# Patient Record
Sex: MALE | Race: WHITE | NOT HISPANIC OR LATINO | Employment: OTHER | URBAN - NONMETROPOLITAN AREA
[De-identification: names, ages, dates, MRNs, and addresses within clinical notes are randomized per-mention and may not be internally consistent; named-entity substitution may affect disease eponyms.]

---

## 2017-09-12 ENCOUNTER — OFFICE VISIT (OUTPATIENT)
Dept: UROLOGY | Facility: CLINIC | Age: 52
End: 2017-09-12

## 2017-09-12 VITALS — HEIGHT: 71 IN | WEIGHT: 245 LBS | BODY MASS INDEX: 34.3 KG/M2

## 2017-09-12 DIAGNOSIS — R33.9 INCOMPLETE EMPTYING OF BLADDER: ICD-10-CM

## 2017-09-12 DIAGNOSIS — R35.0 URINARY FREQUENCY: Primary | ICD-10-CM

## 2017-09-12 DIAGNOSIS — N39.41 URGENCY INCONTINENCE: ICD-10-CM

## 2017-09-12 LAB
BILIRUB BLD-MCNC: NEGATIVE MG/DL
CLARITY, POC: CLEAR
COLOR UR: YELLOW
GLUCOSE UR STRIP-MCNC: NEGATIVE MG/DL
KETONES UR QL: NEGATIVE
LEUKOCYTE EST, POC: NEGATIVE
NITRITE UR-MCNC: NEGATIVE MG/ML
PH UR: 6 [PH] (ref 5–8)
PROT UR STRIP-MCNC: NEGATIVE MG/DL
RBC # UR STRIP: NEGATIVE /UL
SP GR UR: 1.02 (ref 1–1.03)
UROBILINOGEN UR QL: NORMAL

## 2017-09-12 PROCEDURE — 99213 OFFICE O/P EST LOW 20 MIN: CPT | Performed by: UROLOGY

## 2017-09-12 PROCEDURE — 81003 URINALYSIS AUTO W/O SCOPE: CPT | Performed by: UROLOGY

## 2017-09-12 PROCEDURE — 51798 US URINE CAPACITY MEASURE: CPT | Performed by: UROLOGY

## 2017-09-12 RX ORDER — TAMSULOSIN HYDROCHLORIDE 0.4 MG/1
1 CAPSULE ORAL DAILY
Qty: 90 CAPSULE | Refills: 3 | Status: SHIPPED | OUTPATIENT
Start: 2017-09-12 | End: 2019-10-08 | Stop reason: SDUPTHER

## 2017-09-12 RX ORDER — SOLIFENACIN SUCCINATE 5 MG/1
10 TABLET, FILM COATED ORAL DAILY
Qty: 30 TABLET | Refills: 11 | Status: SHIPPED | OUTPATIENT
Start: 2017-09-12 | End: 2018-10-02 | Stop reason: SDUPTHER

## 2017-09-12 NOTE — PROGRESS NOTES
Chief Complaint:          Chief Complaint   Patient presents with   • Urinary Frequency     yearly f/u       HPI:   52 y.o. male.    HPI  Pt has some urine leakage.  Pt has a post void bladder ultrasound of 0 today.  He has some leakage of a drop or 2 without any significant urgency.  Pt is on vesicare and sildenalfil 20 mg.  Pt on flomax.  Pt has had a psa this year and it was normal by history      Past Medical History:        Past Medical History:   Diagnosis Date   • Anxiety    • Arthritis    • Depression    • Gastric ulcer    • Hypertension    • IBS (irritable bowel syndrome)    • Migraine    • Skin cancer    • Sleep apnea          Current Meds:     Current Outpatient Prescriptions   Medication Sig Dispense Refill   • amitriptyline (ELAVIL) 50 MG tablet Take  by mouth.     • aspirin 81 MG tablet Take  by mouth.     • busPIRone (BUSPAR) 10 MG tablet Take  by mouth.     • dexlansoprazole (DEXILANT) 60 MG capsule Take  by mouth.     • dicyclomine (BENTYL) 10 MG capsule Take  by mouth.     • DULoxetine (CYMBALTA) 60 MG capsule Take  by mouth.     • fluticasone (FLONASE) 50 MCG/ACT nasal spray into each nostril.     • hydrOXYzine (ATARAX) 25 MG tablet Take  by mouth.     • lisinopril (PRINIVIL,ZESTRIL) 30 MG tablet Take  by mouth.     • methocarbamol (ROBAXIN) 750 MG tablet Take  by mouth.     • minocycline (DYNACIN) 100 MG tablet Take  by mouth.     • simvastatin (ZOCOR) 10 MG tablet Take  by mouth.     • solifenacin (VESICARE) 5 MG tablet Take 2 tablets by mouth Daily. 30 tablet 11   • SUMAtriptan Succinate (SUMAVEL DOSEPRO) 6 MG/0.5ML solution jet-injector Inject  under the skin.     • tadalafil (CIALIS) 20 MG tablet Take  by mouth.     • tamsulosin (FLOMAX) 0.4 MG capsule 24 hr capsule Take 1 capsule by mouth Daily. 90 capsule 3   • verapamil PM (VERELAN PM) 180 MG 24 hr capsule Take  by mouth.       No current facility-administered medications for this visit.         Allergies:      Allergies   Allergen  Reactions   • Cephalexin         Past Surgical History:     Past Surgical History:   Procedure Laterality Date   • CHOLECYSTECTOMY     • SKIN BIOPSY           Social History:     Social History     Social History   • Marital status:      Spouse name: N/A   • Number of children: N/A   • Years of education: N/A     Occupational History   • Not on file.     Social History Main Topics   • Smoking status: Never Smoker   • Smokeless tobacco: Never Used   • Alcohol use No   • Drug use: No   • Sexual activity: Not on file     Other Topics Concern   • Not on file     Social History Narrative       Family History:     Family History   Problem Relation Age of Onset   • No Known Problems Father    • No Known Problems Mother        Review of Systems:     Review of Systems    Physical Exam:     Physical Exam   Constitutional: He is oriented to person, place, and time.   HENT:   Head: Normocephalic and atraumatic.   Right Ear: External ear normal.   Left Ear: External ear normal.   Nose: Nose normal.   Mouth/Throat: Oropharynx is clear and moist.   Eyes: Conjunctivae and EOM are normal. Pupils are equal, round, and reactive to light.   Neck: Normal range of motion. Neck supple. No thyromegaly present.   Cardiovascular: Normal rate, regular rhythm, normal heart sounds and intact distal pulses.    No murmur heard.  Pulmonary/Chest: Effort normal and breath sounds normal. No respiratory distress. He has no wheezes. He has no rales. He exhibits no tenderness.   Abdominal: Soft. Bowel sounds are normal. He exhibits no distension and no mass. There is no tenderness. No hernia.   Genitourinary: Rectum normal, prostate normal and penis normal.   Musculoskeletal: Normal range of motion. He exhibits no edema or tenderness.   Lymphadenopathy:     He has no cervical adenopathy.   Neurological: He is alert and oriented to person, place, and time. No cranial nerve deficit. He exhibits normal muscle tone. Coordination normal.   Skin: Skin  is warm. No rash noted.   Psychiatric: He has a normal mood and affect. His behavior is normal. Judgment and thought content normal.   Nursing note and vitals reviewed.      Procedure:     No notes on file      Assessment:     Encounter Diagnoses   Name Primary?   • Urinary frequency Yes   • Incomplete emptying of bladder    • Urgency incontinence      Orders Placed This Encounter   Procedures   • Bladder Scan   • POC Urinalysis Dipstick, Automated       Plan:   I have refilled vesicare and flomax.      Counseling was given to patient for the following topics diagnostic results, instructions for management, risk factor reductions, prognosis, patient and family education, impressions, risks and benefits of treatment options and importance of treatment compliance. and the interim medical history and current results were reviewed.  A treatment plan with follow-up was made. Total time of the encounter was 40 minutes and 30 minutes were spent discussing Urinary frequency [R35.0] face-to-face.        This document has been electronically signed by Aris Cortez MD September 12, 2017 2:47 PM

## 2018-10-02 ENCOUNTER — OFFICE VISIT (OUTPATIENT)
Dept: UROLOGY | Facility: CLINIC | Age: 53
End: 2018-10-02

## 2018-10-02 VITALS — WEIGHT: 258 LBS | BODY MASS INDEX: 36.12 KG/M2 | HEIGHT: 71 IN

## 2018-10-02 DIAGNOSIS — N52.9 ERECTILE DYSFUNCTION, UNSPECIFIED ERECTILE DYSFUNCTION TYPE: Primary | ICD-10-CM

## 2018-10-02 DIAGNOSIS — N39.41 URGENCY INCONTINENCE: ICD-10-CM

## 2018-10-02 PROCEDURE — 99214 OFFICE O/P EST MOD 30 MIN: CPT | Performed by: UROLOGY

## 2018-10-02 RX ORDER — SILDENAFIL CITRATE 20 MG/1
20 TABLET ORAL 3 TIMES DAILY
Qty: 90 TABLET | Refills: 5 | Status: SHIPPED | OUTPATIENT
Start: 2018-10-02 | End: 2021-11-22 | Stop reason: CLARIF

## 2018-10-02 RX ORDER — SOLIFENACIN SUCCINATE 5 MG/1
10 TABLET, FILM COATED ORAL DAILY
Qty: 30 TABLET | Refills: 11 | Status: SHIPPED | OUTPATIENT
Start: 2018-10-02 | End: 2019-10-08 | Stop reason: SDUPTHER

## 2018-10-02 NOTE — PROGRESS NOTES
Chief Complaint:          Erectile dysfunction, lethargy and decreased libido and urgency.    HPI:   53 y.o. male.  Pt is doing well with sildenafil.  He is tired and weak and decreased libido.  The pt's psa is 1.71 his blood was drawn this month.  His free testosterone  Was 71 normal above 47.his total was 371 normal above 193.    HPI      Past Medical History:        Past Medical History:   Diagnosis Date   • Anxiety    • Arthritis    • Depression    • Gastric ulcer    • Hypertension    • IBS (irritable bowel syndrome)    • Migraine    • Skin cancer    • Sleep apnea          Current Meds:     Current Outpatient Prescriptions   Medication Sig Dispense Refill   • amitriptyline (ELAVIL) 50 MG tablet Take  by mouth.     • aspirin 81 MG tablet Take  by mouth.     • busPIRone (BUSPAR) 10 MG tablet Take  by mouth.     • dexlansoprazole (DEXILANT) 60 MG capsule Take  by mouth.     • dicyclomine (BENTYL) 10 MG capsule Take  by mouth.     • DULoxetine (CYMBALTA) 60 MG capsule Take  by mouth.     • fluticasone (FLONASE) 50 MCG/ACT nasal spray into each nostril.     • hydrOXYzine (ATARAX) 25 MG tablet Take  by mouth.     • lisinopril (PRINIVIL,ZESTRIL) 30 MG tablet Take  by mouth.     • methocarbamol (ROBAXIN) 750 MG tablet Take  by mouth.     • minocycline (DYNACIN) 100 MG tablet Take  by mouth.     • simvastatin (ZOCOR) 10 MG tablet Take  by mouth.     • solifenacin (VESICARE) 5 MG tablet Take 2 tablets by mouth Daily. 30 tablet 11   • SUMAtriptan Succinate (SUMAVEL DOSEPRO) 6 MG/0.5ML solution jet-injector Inject  under the skin.     • tamsulosin (FLOMAX) 0.4 MG capsule 24 hr capsule Take 1 capsule by mouth Daily. 90 capsule 3   • verapamil PM (VERELAN PM) 180 MG 24 hr capsule Take  by mouth.     • sildenafil (REVATIO) 20 MG tablet Take 1 tablet by mouth 3 (Three) Times a Day. 90 tablet 5     No current facility-administered medications for this visit.         Allergies:      Allergies   Allergen Reactions   • Cephalexin          Past Surgical History:     Past Surgical History:   Procedure Laterality Date   • CHOLECYSTECTOMY     • SKIN BIOPSY           Social History:     Social History     Social History   • Marital status:      Spouse name: N/A   • Number of children: N/A   • Years of education: N/A     Occupational History   • Not on file.     Social History Main Topics   • Smoking status: Never Smoker   • Smokeless tobacco: Never Used   • Alcohol use No   • Drug use: No   • Sexual activity: Not on file     Other Topics Concern   • Not on file     Social History Narrative   • No narrative on file       Family History:     Family History   Problem Relation Age of Onset   • No Known Problems Father    • No Known Problems Mother        Review of Systems:     Review of Systems   Constitutional: Positive for fatigue. Negative for chills and fever.   HENT: Negative for congestion and sinus pressure.    Respiratory: Negative for shortness of breath.    Cardiovascular: Negative for chest pain.   Gastrointestinal: Negative for abdominal pain, constipation, diarrhea, nausea and vomiting.   Genitourinary: Negative for frequency and urgency.   Musculoskeletal: Negative for back pain and neck pain.   Neurological: Negative for dizziness and headaches.   Hematological: Does not bruise/bleed easily.   Psychiatric/Behavioral: The patient is not nervous/anxious.        I have reviewed the follow portions of the patient's history and confirmed they are accurate today:  allergies, current medications, past family history, past medical history, past social history, past surgical history, problem list and ROS  Physical Exam:     Physical Exam   Constitutional: He is oriented to person, place, and time.   HENT:   Head: Normocephalic and atraumatic.   Right Ear: External ear normal.   Left Ear: External ear normal.   Nose: Nose normal.   Mouth/Throat: Oropharynx is clear and moist.   Eyes: Pupils are equal, round, and reactive to light.  "Conjunctivae and EOM are normal.   Neck: Normal range of motion. Neck supple. No thyromegaly present.   Cardiovascular: Normal rate, regular rhythm, normal heart sounds and intact distal pulses.    No murmur heard.  Pulmonary/Chest: Effort normal and breath sounds normal. No respiratory distress. He has no wheezes. He has no rales. He exhibits no tenderness.   Abdominal: Soft. Bowel sounds are normal. He exhibits no distension and no mass. There is no tenderness. No hernia.   Genitourinary: Rectum normal, prostate normal and penis normal.   Musculoskeletal: Normal range of motion. He exhibits no edema or tenderness.   Lymphadenopathy:     He has no cervical adenopathy.   Neurological: He is alert and oriented to person, place, and time. No cranial nerve deficit. He exhibits normal muscle tone. Coordination normal.   Skin: Skin is warm. No rash noted.   Psychiatric: He has a normal mood and affect. His behavior is normal. Judgment and thought content normal.   Nursing note and vitals reviewed.      Ht 180.3 cm (71\")   Wt 117 kg (258 lb)   BMI 35.98 kg/m²    Procedure:         Assessment:     Encounter Diagnoses   Name Primary?   • Erectile dysfunction, unspecified erectile dysfunction type Yes   • Urgency incontinence      No orders of the defined types were placed in this encounter.      Plan:   Will refill his vesicare and sildenafil.  I discussed obesity and conversion of testosterone to estrogen by fat cells.  I discussed effective ways of weight loss and importance of exercise.  Just even walking    Patient's Body mass index is 35.98 kg/m². BMI is within normal parameters. No follow-up required.      Counseling was given to patient for the following topics instructions for management as follows: weight loss and exercise. The interim medical history and current results were reviewed.  A treatment plan with follow-up was made for Erectile dysfunction, unspecified erectile dysfunction type [N52.9]. I spent 33 " minutes face to face with Francisco Prieto and 90 percentage was spent in counseling.    This document has been electronically signed by Aris Cortez MD October 2, 2018 10:29 AM

## 2019-10-08 ENCOUNTER — OFFICE VISIT (OUTPATIENT)
Dept: UROLOGY | Facility: CLINIC | Age: 54
End: 2019-10-08

## 2019-10-08 VITALS
HEIGHT: 71 IN | SYSTOLIC BLOOD PRESSURE: 120 MMHG | WEIGHT: 245 LBS | DIASTOLIC BLOOD PRESSURE: 88 MMHG | BODY MASS INDEX: 34.3 KG/M2

## 2019-10-08 DIAGNOSIS — R35.0 URINARY FREQUENCY: ICD-10-CM

## 2019-10-08 DIAGNOSIS — N52.9 ERECTILE DYSFUNCTION, UNSPECIFIED ERECTILE DYSFUNCTION TYPE: Primary | ICD-10-CM

## 2019-10-08 DIAGNOSIS — R35.1 BENIGN PROSTATIC HYPERPLASIA WITH NOCTURIA: ICD-10-CM

## 2019-10-08 DIAGNOSIS — N39.41 URGENCY INCONTINENCE: ICD-10-CM

## 2019-10-08 DIAGNOSIS — N40.1 BENIGN PROSTATIC HYPERPLASIA WITH NOCTURIA: ICD-10-CM

## 2019-10-08 PROCEDURE — 99214 OFFICE O/P EST MOD 30 MIN: CPT | Performed by: UROLOGY

## 2019-10-08 RX ORDER — BACLOFEN 10 MG/1
TABLET ORAL 2 TIMES DAILY
Refills: 2 | COMMUNITY
Start: 2019-09-30

## 2019-10-08 RX ORDER — MELOXICAM 7.5 MG/1
TABLET ORAL 2 TIMES DAILY
Refills: 2 | COMMUNITY
Start: 2019-09-30

## 2019-10-08 RX ORDER — BUSPIRONE HYDROCHLORIDE 15 MG/1
TABLET ORAL 2 TIMES DAILY
Refills: 3 | COMMUNITY
Start: 2019-09-30

## 2019-10-08 RX ORDER — SILDENAFIL 100 MG/1
100 TABLET, FILM COATED ORAL DAILY PRN
Qty: 20 TABLET | Refills: 11 | Status: SHIPPED | OUTPATIENT
Start: 2019-10-08 | End: 2019-10-08

## 2019-10-08 RX ORDER — AZELAIC ACID 0.15 G/G
GEL TOPICAL
COMMUNITY
Start: 2019-09-30

## 2019-10-08 RX ORDER — VERAPAMIL HYDROCHLORIDE 240 MG/1
TABLET, FILM COATED, EXTENDED RELEASE ORAL DAILY
Refills: 2 | COMMUNITY
Start: 2019-09-30

## 2019-10-08 RX ORDER — HYDROCODONE BITARTRATE AND ACETAMINOPHEN 7.5; 325 MG/1; MG/1
TABLET ORAL
Refills: 0 | COMMUNITY
Start: 2019-10-03

## 2019-10-08 RX ORDER — SOLIFENACIN SUCCINATE 10 MG/1
10 TABLET, FILM COATED ORAL DAILY
Qty: 30 TABLET | Refills: 11 | Status: SHIPPED | OUTPATIENT
Start: 2019-10-08 | End: 2019-10-08 | Stop reason: SDUPTHER

## 2019-10-08 RX ORDER — SOLIFENACIN SUCCINATE 10 MG/1
TABLET, FILM COATED ORAL DAILY
Refills: 12 | COMMUNITY
Start: 2019-09-30 | End: 2019-10-08 | Stop reason: SDUPTHER

## 2019-10-08 RX ORDER — LAMOTRIGINE 150 MG/1
TABLET ORAL DAILY
Refills: 3 | COMMUNITY
Start: 2019-09-30

## 2019-10-08 RX ORDER — ISOTRETINOIN 40 MG/1
CAPSULE ORAL 2 TIMES DAILY
COMMUNITY
Start: 2019-08-12

## 2019-10-08 RX ORDER — HYDROXYZINE PAMOATE 25 MG/1
CAPSULE ORAL 2 TIMES DAILY
Refills: 3 | COMMUNITY
Start: 2019-09-30

## 2019-10-08 RX ORDER — SOLIFENACIN SUCCINATE 10 MG/1
10 TABLET, FILM COATED ORAL DAILY
Qty: 30 TABLET | Refills: 11 | Status: SHIPPED | OUTPATIENT
Start: 2019-10-08 | End: 2020-10-13 | Stop reason: SDUPTHER

## 2019-10-08 RX ORDER — MONTELUKAST SODIUM 4 MG/1
TABLET, CHEWABLE ORAL
Refills: 2 | COMMUNITY
Start: 2019-09-30

## 2019-10-08 RX ORDER — AMITRIPTYLINE HYDROCHLORIDE 100 MG/1
TABLET, FILM COATED ORAL DAILY
Refills: 3 | COMMUNITY
Start: 2019-09-30

## 2019-10-08 RX ORDER — RANITIDINE 300 MG/1
TABLET ORAL 2 TIMES DAILY
Refills: 2 | COMMUNITY
Start: 2019-09-30 | End: 2021-11-22 | Stop reason: SDUPTHER

## 2019-10-08 RX ORDER — TOPIRAMATE 25 MG/1
TABLET ORAL 2 TIMES DAILY
Refills: 3 | COMMUNITY
Start: 2019-09-30

## 2019-10-08 RX ORDER — LISINOPRIL 40 MG/1
TABLET ORAL DAILY
Refills: 2 | COMMUNITY
Start: 2019-09-30

## 2019-10-08 RX ORDER — SILDENAFIL 100 MG/1
100 TABLET, FILM COATED ORAL DAILY PRN
Qty: 20 TABLET | Refills: 11 | Status: SHIPPED | OUTPATIENT
Start: 2019-10-08 | End: 2020-10-13 | Stop reason: SDUPTHER

## 2019-10-08 RX ORDER — IPRATROPIUM BROMIDE 42 UG/1
SPRAY, METERED NASAL
Refills: 6 | COMMUNITY
Start: 2019-09-30 | End: 2019-10-08 | Stop reason: SDUPTHER

## 2019-10-08 RX ORDER — DICYCLOMINE HCL 20 MG
TABLET ORAL AS NEEDED
Refills: 2 | COMMUNITY
Start: 2019-09-30

## 2019-10-08 RX ORDER — AZELASTINE 1 MG/ML
SPRAY, METERED NASAL
Refills: 6 | COMMUNITY
Start: 2019-09-30

## 2019-10-08 RX ORDER — TAMSULOSIN HYDROCHLORIDE 0.4 MG/1
1 CAPSULE ORAL DAILY
Qty: 90 CAPSULE | Refills: 3 | Status: SHIPPED | OUTPATIENT
Start: 2019-10-08 | End: 2020-10-13 | Stop reason: SDUPTHER

## 2019-10-08 RX ORDER — ASPIRIN 81 MG
TABLET, DELAYED RELEASE (ENTERIC COATED) ORAL DAILY
Refills: 2 | COMMUNITY
Start: 2019-09-30

## 2019-10-08 NOTE — PROGRESS NOTES
Chief Complaint:          ED loss of libido.    HPI:   54 y.o. male.    HPI  Pt does well with sildenafil.  He asked for generic vesicare.  Pt has a testoseterone of 438.    Past Medical History:        Past Medical History:   Diagnosis Date   • Anxiety    • Arthritis    • Depression    • Gastric ulcer    • Hypertension    • IBS (irritable bowel syndrome)    • Migraine    • Skin cancer    • Sleep apnea          Current Meds:     Current Outpatient Medications   Medication Sig Dispense Refill   • amitriptyline (ELAVIL) 100 MG tablet Daily.  3   • AMNESTEEM 40 MG capsule 2 (Two) Times a Day.     • ASPIRIN LOW DOSE 81 MG EC tablet Daily.  2   • azelaic acid (AZELEX) 15 % gel      • azelastine (ASTELIN) 0.1 % nasal spray   6   • baclofen (LIORESAL) 10 MG tablet 2 (Two) Times a Day.  2   • busPIRone (BUSPAR) 15 MG tablet 2 (Two) Times a Day.  3   • colestipol (COLESTID) 1 g tablet   2   • dicyclomine (BENTYL) 20 MG tablet As Needed.  2   • DULoxetine (CYMBALTA) 60 MG capsule Take  by mouth.     • fluticasone (FLONASE) 50 MCG/ACT nasal spray into each nostril.     • HYDROcodone-acetaminophen (NORCO) 7.5-325 MG per tablet   0   • hydrOXYzine pamoate (VISTARIL) 25 MG capsule 2 (Two) Times a Day.  3   • lamoTRIgine (LaMICtal) 150 MG tablet Daily.  3   • lisinopril (PRINIVIL,ZESTRIL) 40 MG tablet Daily.  2   • meloxicam (MOBIC) 7.5 MG tablet 2 (Two) Times a Day.  2   • methocarbamol (ROBAXIN) 750 MG tablet Take  by mouth.     • metroNIDAZOLE (METROCREAM) 0.75 % cream   5   • minocycline (DYNACIN) 100 MG tablet Take  by mouth.     • raNITIdine (ZANTAC) 300 MG tablet 2 (Two) Times a Day.  2   • sildenafil (REVATIO) 20 MG tablet Take 1 tablet by mouth 3 (Three) Times a Day. 90 tablet 5   • simvastatin (ZOCOR) 10 MG tablet Take  by mouth.     • solifenacin (VESICARE) 10 MG tablet Daily.  12   • SUMAtriptan Succinate (SUMAVEL DOSEPRO) 6 MG/0.5ML solution jet-injector Inject  under the skin.     • tamsulosin (FLOMAX) 0.4 MG capsule  24 hr capsule Take 1 capsule by mouth Daily. 90 capsule 3   • topiramate (TOPAMAX) 25 MG tablet 2 (Two) Times a Day.  3   • verapamil SR (CALAN-SR) 240 MG CR tablet Daily.  2   • sildenafil (VIAGRA) 100 MG tablet Take 1 tablet by mouth Daily As Needed for erectile dysfunction. 20 tablet 11   • solifenacin (VESICARE) 10 MG tablet Take 1 tablet by mouth Daily. 30 tablet 11     No current facility-administered medications for this visit.         Allergies:      Allergies   Allergen Reactions   • Cephalexin         Past Surgical History:     Past Surgical History:   Procedure Laterality Date   • CHOLECYSTECTOMY     • SKIN BIOPSY           Social History:     Social History     Socioeconomic History   • Marital status:      Spouse name: Not on file   • Number of children: Not on file   • Years of education: Not on file   • Highest education level: Not on file   Tobacco Use   • Smoking status: Never Smoker   • Smokeless tobacco: Never Used   Substance and Sexual Activity   • Alcohol use: No   • Drug use: No       Family History:     Family History   Problem Relation Age of Onset   • No Known Problems Father    • No Known Problems Mother        Review of Systems:     Review of Systems   Constitutional: Positive for fatigue. Negative for chills and fever.   HENT: Negative for sinus pressure and sinus pain.    Respiratory: Negative for cough.    Cardiovascular: Negative for leg swelling.   Gastrointestinal: Negative for abdominal pain.   Genitourinary: Negative for dysuria, frequency and urgency.   Musculoskeletal: Negative for back pain.   Neurological: Negative for headaches.   Psychiatric/Behavioral: The patient is not nervous/anxious.          Physical Exam:     Physical Exam   Constitutional: He is oriented to person, place, and time.   HENT:   Head: Normocephalic and atraumatic.   Right Ear: External ear normal.   Left Ear: External ear normal.   Nose: Nose normal.   Mouth/Throat: Oropharynx is clear and moist.  "  Eyes: Conjunctivae and EOM are normal. Pupils are equal, round, and reactive to light.   Neck: Normal range of motion. Neck supple. No thyromegaly present.   Cardiovascular: Normal rate, regular rhythm, normal heart sounds and intact distal pulses.   No murmur heard.  Pulmonary/Chest: Effort normal and breath sounds normal. No respiratory distress. He has no wheezes. He has no rales. He exhibits no tenderness.   Abdominal: Soft. Bowel sounds are normal. He exhibits no distension and no mass. There is no tenderness. No hernia.   Genitourinary: Rectum normal, prostate normal and penis normal.   Musculoskeletal: Normal range of motion. He exhibits no edema or tenderness.   Lymphadenopathy:     He has no cervical adenopathy.   Neurological: He is alert and oriented to person, place, and time. No cranial nerve deficit. He exhibits normal muscle tone. Coordination normal.   Skin: Skin is warm. No rash noted.   Psychiatric: He has a normal mood and affect. His behavior is normal. Judgment and thought content normal.   Nursing note and vitals reviewed.      /88 (BP Location: Left arm, Patient Position: Sitting, Cuff Size: Adult)   Ht 180.3 cm (71\")   Wt 111 kg (245 lb)   BMI 34.17 kg/m²    Procedure:         Assessment:     Encounter Diagnoses   Name Primary?   • Erectile dysfunction, unspecified erectile dysfunction type Yes   • Urgency incontinence    • Benign prostatic hyperplasia with nocturia        Orders Placed This Encounter   Procedures   • PSA DIAGNOSTIC     Standing Status:   Future     Standing Expiration Date:   10/8/2022       Patient reports that he is not currently experiencing any symptoms of urinary incontinence.      Plan:   Will refill his generic viagra and vesicare.    Patient's Body mass index is 34.17 kg/m². BMI is above normal parameters. Recommendations include: educational material.      Smoking Cessation Counseling:  Never a smoker.  Patient does not currently use any tobacco products. "     Counseling was given to patient for the following topics instructions for management as follows: ED and urgency. The interim medical history and current results were reviewed.  A treatment plan with follow-up was made for Erectile dysfunction, unspecified erectile dysfunction type [N52.9]   This document has been electronically signed by Aris Cortez MD October 8, 2019 12:01 PM

## 2020-10-13 ENCOUNTER — OFFICE VISIT (OUTPATIENT)
Dept: UROLOGY | Facility: CLINIC | Age: 55
End: 2020-10-13

## 2020-10-13 VITALS — WEIGHT: 271.8 LBS | TEMPERATURE: 99 F | HEIGHT: 71 IN | BODY MASS INDEX: 38.05 KG/M2

## 2020-10-13 DIAGNOSIS — N52.1 ERECTILE DYSFUNCTION DUE TO DISEASES CLASSIFIED ELSEWHERE: ICD-10-CM

## 2020-10-13 DIAGNOSIS — R35.0 URINARY FREQUENCY: ICD-10-CM

## 2020-10-13 DIAGNOSIS — N48.1 BALANITIS: ICD-10-CM

## 2020-10-13 DIAGNOSIS — N39.41 URGENCY INCONTINENCE: Primary | ICD-10-CM

## 2020-10-13 DIAGNOSIS — N40.1 BENIGN PROSTATIC HYPERPLASIA WITH LOWER URINARY TRACT SYMPTOMS, SYMPTOM DETAILS UNSPECIFIED: ICD-10-CM

## 2020-10-13 DIAGNOSIS — N52.9 ERECTILE DYSFUNCTION, UNSPECIFIED ERECTILE DYSFUNCTION TYPE: ICD-10-CM

## 2020-10-13 LAB
BILIRUB BLD-MCNC: ABNORMAL MG/DL
CLARITY, POC: CLEAR
COLOR UR: YELLOW
GLUCOSE UR STRIP-MCNC: NEGATIVE MG/DL
KETONES UR QL: NEGATIVE
LEUKOCYTE EST, POC: NEGATIVE
NITRITE UR-MCNC: NEGATIVE MG/ML
PH UR: 5 [PH] (ref 5–8)
PROT UR STRIP-MCNC: NEGATIVE MG/DL
RBC # UR STRIP: NEGATIVE /UL
SP GR UR: 1.03 (ref 1–1.03)
UROBILINOGEN UR QL: NORMAL

## 2020-10-13 PROCEDURE — 81003 URINALYSIS AUTO W/O SCOPE: CPT | Performed by: UROLOGY

## 2020-10-13 PROCEDURE — 99214 OFFICE O/P EST MOD 30 MIN: CPT | Performed by: UROLOGY

## 2020-10-13 RX ORDER — TAMSULOSIN HYDROCHLORIDE 0.4 MG/1
1 CAPSULE ORAL DAILY
Qty: 90 CAPSULE | Refills: 3 | Status: SHIPPED | OUTPATIENT
Start: 2020-10-13 | End: 2021-11-22 | Stop reason: SDUPTHER

## 2020-10-13 RX ORDER — CLOTRIMAZOLE AND BETAMETHASONE DIPROPIONATE 10; .64 MG/G; MG/G
CREAM TOPICAL 2 TIMES DAILY
Qty: 45 G | Refills: 1 | Status: SHIPPED | OUTPATIENT
Start: 2020-10-13 | End: 2022-02-16 | Stop reason: SDUPTHER

## 2020-10-13 RX ORDER — SOLIFENACIN SUCCINATE 10 MG/1
10 TABLET, FILM COATED ORAL DAILY
Qty: 30 TABLET | Refills: 11 | Status: SHIPPED | OUTPATIENT
Start: 2020-10-13 | End: 2021-11-22 | Stop reason: SDUPTHER

## 2020-10-13 RX ORDER — SILDENAFIL 100 MG/1
100 TABLET, FILM COATED ORAL DAILY PRN
Qty: 20 TABLET | Refills: 11 | Status: SHIPPED | OUTPATIENT
Start: 2020-10-13 | End: 2020-10-13 | Stop reason: SDUPTHER

## 2020-10-13 RX ORDER — SILDENAFIL 100 MG/1
100 TABLET, FILM COATED ORAL DAILY PRN
Qty: 20 TABLET | Refills: 11 | Status: SHIPPED | OUTPATIENT
Start: 2020-10-13 | End: 2021-11-22 | Stop reason: SDUPTHER

## 2020-10-13 NOTE — PROGRESS NOTES
Chief Complaint:          BPH and ED.    HPI:   55 y.o. male.  hIs psa is 1.23 this year.His creat is 1.57  HPI  Pt is on vesicare and flomax.  He takes sildenafil as needed.    Past Medical History:        Past Medical History:   Diagnosis Date   • Anxiety    • Arthritis    • Depression    • Gastric ulcer    • Hypertension    • IBS (irritable bowel syndrome)    • Migraine    • Skin cancer    • Sleep apnea          Current Meds:     Current Outpatient Medications   Medication Sig Dispense Refill   • amitriptyline (ELAVIL) 100 MG tablet Daily.  3   • AMNESTEEM 40 MG capsule 2 (Two) Times a Day.     • ASPIRIN LOW DOSE 81 MG EC tablet Daily.  2   • azelaic acid (AZELEX) 15 % gel      • azelastine (ASTELIN) 0.1 % nasal spray   6   • baclofen (LIORESAL) 10 MG tablet 2 (Two) Times a Day.  2   • busPIRone (BUSPAR) 15 MG tablet 2 (Two) Times a Day.  3   • colestipol (COLESTID) 1 g tablet   2   • dicyclomine (BENTYL) 20 MG tablet As Needed.  2   • DULoxetine (CYMBALTA) 60 MG capsule Take  by mouth.     • fluticasone (FLONASE) 50 MCG/ACT nasal spray into each nostril.     • HYDROcodone-acetaminophen (NORCO) 7.5-325 MG per tablet   0   • hydrOXYzine pamoate (VISTARIL) 25 MG capsule 2 (Two) Times a Day.  3   • lamoTRIgine (LaMICtal) 150 MG tablet Daily.  3   • lisinopril (PRINIVIL,ZESTRIL) 40 MG tablet Daily.  2   • meloxicam (MOBIC) 7.5 MG tablet 2 (Two) Times a Day.  2   • methocarbamol (ROBAXIN) 750 MG tablet Take  by mouth.     • metroNIDAZOLE (METROCREAM) 0.75 % cream   5   • minocycline (DYNACIN) 100 MG tablet Take  by mouth.     • raNITIdine (ZANTAC) 300 MG tablet 2 (Two) Times a Day.  2   • sildenafil (REVATIO) 20 MG tablet Take 1 tablet by mouth 3 (Three) Times a Day. 90 tablet 5   • sildenafil (Viagra) 100 MG tablet Take 1 tablet by mouth Daily As Needed for Erectile Dysfunction. 20 tablet 11   • simvastatin (ZOCOR) 10 MG tablet Take  by mouth.     • solifenacin (VESICARE) 10 MG tablet Take 1 tablet by mouth Daily. 30  tablet 11   • SUMAtriptan Succinate (SUMAVEL DOSEPRO) 6 MG/0.5ML solution jet-injector Inject  under the skin.     • tamsulosin (FLOMAX) 0.4 MG capsule 24 hr capsule Take 1 capsule by mouth Daily. 90 capsule 3   • topiramate (TOPAMAX) 25 MG tablet 2 (Two) Times a Day.  3   • verapamil SR (CALAN-SR) 240 MG CR tablet Daily.  2     No current facility-administered medications for this visit.         Allergies:      Allergies   Allergen Reactions   • Cephalexin         Past Surgical History:     Past Surgical History:   Procedure Laterality Date   • CHOLECYSTECTOMY     • SKIN BIOPSY           Social History:     Social History     Socioeconomic History   • Marital status:      Spouse name: Not on file   • Number of children: Not on file   • Years of education: Not on file   • Highest education level: Not on file   Tobacco Use   • Smoking status: Never Smoker   • Smokeless tobacco: Never Used   Substance and Sexual Activity   • Alcohol use: No   • Drug use: No   • Sexual activity: Defer       Family History:     Family History   Problem Relation Age of Onset   • No Known Problems Father    • No Known Problems Mother        Review of Systems:     Review of Systems   Constitutional: Positive for fatigue. Negative for chills and fever.   HENT: Positive for sinus pressure. Negative for congestion.    Eyes: Negative for pain and redness.   Respiratory: Positive for shortness of breath. Negative for chest tightness.    Cardiovascular: Negative for chest pain.   Gastrointestinal: Positive for abdominal pain, constipation and diarrhea. Negative for nausea and vomiting.   Endocrine: Negative for heat intolerance.   Genitourinary: Positive for frequency. Negative for dysuria, flank pain, hematuria and urgency.   Musculoskeletal: Negative for back pain and neck pain.   Skin: Negative for rash.   Allergic/Immunologic: Negative for food allergies.   Neurological: Positive for headaches. Negative for dizziness.   Hematological:  "Bruises/bleeds easily.   Psychiatric/Behavioral: The patient is not nervous/anxious.        IPSS Questionnaire (AUA-7):  Over the past month…    1)  Incomplete Emptying  How often have you had a sensation of not emptying your bladder?  2 - Less than half the time   2)  Frequency  How often have you had to urinate less than every two hours? 1 - Less than 1 time in 5   3)  Intermittency  How often have you found you stopped and started again several times when you urinated?  0 - Not at all   4) Urgency  How often have you found it difficult to postpone urination?  3 - About half the time   5) Weak Stream  How often have you had a weak urinary stream?  3 - About half the time   6) Straining  How often have you had to push or strain to begin urination?  2 - Less than half the time   7) Nocturia  How many times did you typically get up at night to urinate?  1 - 1 time   Total Score:  12       Quality of life due to urinary symptoms:  If you were to spend the rest of your life with your urinary condition the way it is now, how would you feel about that? 2-Mostly Satisfied   Urine Leakage (Incontinence) 1-Mild (A few drops a day, no pad use)       I have reviewed the follow portions of the patient's history and confirmed they are accurate today:  allergies, current medications, past family history, past medical history, past social history, past surgical history, problem list and ROS  Physical Exam:     Physical Exam    Temp 99 °F (37.2 °C)   Ht 180.3 cm (71\")   Wt 123 kg (271 lb 12.8 oz)   BMI 37.91 kg/m²    Procedure:         Assessment:     Encounter Diagnoses   Name Primary?   • Urgency incontinence Yes   • Erectile dysfunction due to diseases classified elsewhere    • Benign prostatic hyperplasia with lower urinary tract symptoms, symptom details unspecified    • Urinary frequency    • Erectile dysfunction, unspecified erectile dysfunction type        Orders Placed This Encounter   Procedures   • POC Urinalysis " Dipstick, Automated       Patient reports that he is not currently experiencing any symptoms of urinary incontinence.      Plan:           Will rx lotrisone for balanitis and will see pt next year.    Patient's Body mass index is 37.91 kg/m². BMI is above normal parameters. Recommendations include: referral to primary care.      Smoking Cessation Counseling:  Former smoker.  Patient does not currently use any tobacco products.   Counseling was given to patient for the following topics instructions for management as follows: ED and urgency and BPH. The interim medical history and current results were reviewed.  A treatment plan with follow-up was made for Urgency incontinence [N39.41]. I spent 31 minutes face to face with Francisco Prieto and 80 percentage was spent in counseling.       This document has been electronically signed by Aris Cortez MD October 13, 2020 12:01 EDT

## 2020-12-31 ENCOUNTER — HOSPITAL ENCOUNTER (OUTPATIENT)
Dept: HOSPITAL 79 - LAB | Age: 55
End: 2020-12-31
Payer: MEDICARE

## 2020-12-31 DIAGNOSIS — R53.83: ICD-10-CM

## 2020-12-31 DIAGNOSIS — Z79.899: ICD-10-CM

## 2020-12-31 DIAGNOSIS — E78.5: ICD-10-CM

## 2020-12-31 DIAGNOSIS — L70.0: ICD-10-CM

## 2020-12-31 DIAGNOSIS — L73.2: ICD-10-CM

## 2020-12-31 DIAGNOSIS — L71.8: Primary | ICD-10-CM

## 2020-12-31 DIAGNOSIS — E53.8: ICD-10-CM

## 2020-12-31 LAB
BUN/CREATININE RATIO: 10 (ref 0–10)
HGB BLD-MCNC: 14.2 GM/DL (ref 14–17.5)
RED BLOOD COUNT: 5.34 M/UL (ref 4.2–5.5)
WHITE BLOOD COUNT: 5.8 K/UL (ref 4.5–11)

## 2021-05-18 ENCOUNTER — HOSPITAL ENCOUNTER (OUTPATIENT)
Dept: HOSPITAL 79 - EMI | Age: 56
End: 2021-05-18
Attending: NURSE PRACTITIONER
Payer: MEDICARE

## 2021-05-18 DIAGNOSIS — G44.89: ICD-10-CM

## 2021-05-18 DIAGNOSIS — R90.82: ICD-10-CM

## 2021-05-18 DIAGNOSIS — G43.009: Primary | ICD-10-CM

## 2021-11-08 ENCOUNTER — HOSPITAL ENCOUNTER (OUTPATIENT)
Dept: HOSPITAL 79 - HEART 5 | Age: 56
End: 2021-11-08
Attending: INTERNAL MEDICINE
Payer: MEDICARE

## 2021-11-08 DIAGNOSIS — R01.1: Primary | ICD-10-CM

## 2021-11-22 ENCOUNTER — OFFICE VISIT (OUTPATIENT)
Dept: UROLOGY | Facility: CLINIC | Age: 56
End: 2021-11-22

## 2021-11-22 VITALS — HEIGHT: 71 IN | WEIGHT: 285 LBS | BODY MASS INDEX: 39.9 KG/M2

## 2021-11-22 DIAGNOSIS — N39.41 URGENCY INCONTINENCE: ICD-10-CM

## 2021-11-22 DIAGNOSIS — N42.9 DISORDER OF PROSTATE: ICD-10-CM

## 2021-11-22 DIAGNOSIS — N52.9 ERECTILE DYSFUNCTION, UNSPECIFIED ERECTILE DYSFUNCTION TYPE: ICD-10-CM

## 2021-11-22 DIAGNOSIS — R35.0 URINARY FREQUENCY: ICD-10-CM

## 2021-11-22 DIAGNOSIS — N40.1 BENIGN PROSTATIC HYPERPLASIA WITH LOWER URINARY TRACT SYMPTOMS, SYMPTOM DETAILS UNSPECIFIED: Primary | ICD-10-CM

## 2021-11-22 DIAGNOSIS — N52.9 IMPOTENCE OF ORGANIC ORIGIN: ICD-10-CM

## 2021-11-22 PROCEDURE — 84403 ASSAY OF TOTAL TESTOSTERONE: CPT | Performed by: UROLOGY

## 2021-11-22 PROCEDURE — 84153 ASSAY OF PSA TOTAL: CPT | Performed by: UROLOGY

## 2021-11-22 PROCEDURE — 85027 COMPLETE CBC AUTOMATED: CPT | Performed by: UROLOGY

## 2021-11-22 PROCEDURE — 99214 OFFICE O/P EST MOD 30 MIN: CPT | Performed by: UROLOGY

## 2021-11-22 PROCEDURE — 82670 ASSAY OF TOTAL ESTRADIOL: CPT | Performed by: UROLOGY

## 2021-11-22 RX ORDER — IBUPROFEN 800 MG/1
TABLET ORAL
COMMUNITY
Start: 2021-10-13

## 2021-11-22 RX ORDER — GALCANEZUMAB 120 MG/ML
INJECTION, SOLUTION SUBCUTANEOUS
COMMUNITY
Start: 2021-10-26

## 2021-11-22 RX ORDER — FAMOTIDINE 40 MG/1
TABLET, FILM COATED ORAL
COMMUNITY
Start: 2021-11-08

## 2021-11-22 RX ORDER — DEXLANSOPRAZOLE 60 MG/1
CAPSULE, DELAYED RELEASE ORAL
COMMUNITY
Start: 2021-11-08

## 2021-11-22 RX ORDER — SIMVASTATIN 20 MG
TABLET ORAL
COMMUNITY
Start: 2021-11-08

## 2021-11-22 RX ORDER — COLCHICINE 0.6 MG/1
TABLET ORAL
COMMUNITY
Start: 2021-10-13

## 2021-11-22 RX ORDER — SILDENAFIL 100 MG/1
100 TABLET, FILM COATED ORAL DAILY PRN
Qty: 20 TABLET | Refills: 11 | Status: SHIPPED | OUTPATIENT
Start: 2021-11-22

## 2021-11-22 RX ORDER — SOLIFENACIN SUCCINATE 10 MG/1
10 TABLET, FILM COATED ORAL DAILY
Qty: 30 TABLET | Refills: 11 | Status: SHIPPED | OUTPATIENT
Start: 2021-11-22

## 2021-11-22 RX ORDER — CLOTRIMAZOLE AND BETAMETHASONE DIPROPIONATE 10; .64 MG/G; MG/G
1 CREAM TOPICAL 2 TIMES DAILY
Qty: 45 G | Refills: 2 | Status: SHIPPED | OUTPATIENT
Start: 2021-11-22 | End: 2022-02-16

## 2021-11-22 RX ORDER — TAMSULOSIN HYDROCHLORIDE 0.4 MG/1
1 CAPSULE ORAL DAILY
Qty: 90 CAPSULE | Refills: 3 | Status: SHIPPED | OUTPATIENT
Start: 2021-11-22

## 2021-11-22 RX ORDER — BETAMETHASONE DIPROPIONATE 0.5 MG/G
LOTION TOPICAL
COMMUNITY
Start: 2021-11-08

## 2021-11-22 RX ORDER — RIZATRIPTAN BENZOATE 10 MG/1
TABLET, ORALLY DISINTEGRATING ORAL
COMMUNITY
Start: 2021-10-26

## 2021-11-22 NOTE — PROGRESS NOTES
"Chief Complaint:          Chief Complaint   Patient presents with   • Urinary Incontinence   • Erectile Dysfunction       HPI:   56 y.o. male returns today.  He has been seen previously by Dr. Aris Cortez has a number of issues.  He is incontinent he takes Vesicare in a as needed basis he has hot flashes and a significant positive LUIS-androgen deficiency in the age male questionnaire:   The patient was queried regarding the androgen deficiency in the age male questionnaire.  This is a validated questionnaire that was performed on a set of 314 [facility] male physicians. When it was positive, it correlated directly with a 94% chance of low testosterone.  Patient indicates there is a decrease in libido or sex drive, a lack of energy, decreased  strength and endurance, a decreased \"enjoyment of life\", sad and grumpy feelings with significant difficulty maintaining erections. There has also been a recent deterioration regarding work performance.  Apparently had a testosterone PSA drawn at his PCP which was 292.  He wants refills of his Vesicare.  I reviewed what labs were provided.  Unfortunately was not enough to do what I need he had a testosterone of 292 and a free testosterone of 54.  He had a normal hemoglobin.  And no other abdomen labs checked.  I would initiate a work-up prostatitis-we discussed the differential diagnosis of prostatitis including the National Institutes of Health classification system I through IV.  I discussed that type I prostatitis is acute bacterial prostatitis and associated with high fevers, typically hematuria, and severe pain with voiding.  We discussed the fact that it necessitates 6 weeks of chronic antibiotics to be sure it doesn't turn into a chronic bacterial prostatitis that can have lifelong ramifications.  We discussed National Institutes of Health type II chronic bacterial prostatitis.  We discussed the fact that this a chronic bacterial infection of the prostate that often " requires suppressive antibiotics.  We discussed type III being related more to nonspecific urethritis, as well as tight for being related to finding inflammation and a biopsy in the absence of symptomatology.  I discussed the diagnostic strategies including the VB 1 through 4 urine culture and discussed empiric therapy.  I emphasized that with the use of chronic antibiotics, I strongly recommended the concomitant use of probiotics.  Additionally, we discussed the various antibiotics used and the risks and benefits associated with each, particularly the use of long-term ciprofloxacin and the effect on joints and collagen in human beings.  Given refills of his medication and he will follow up with him based on this      Past Medical History:        Past Medical History:   Diagnosis Date   • Anxiety    • Arthritis    • Depression    • Gastric ulcer    • Hypertension    • IBS (irritable bowel syndrome)    • Migraine    • Skin cancer    • Sleep apnea          Current Meds:     Current Outpatient Medications   Medication Sig Dispense Refill   • amitriptyline (ELAVIL) 100 MG tablet Daily.  3   • AMNESTEEM 40 MG capsule 2 (Two) Times a Day.     • ASPIRIN LOW DOSE 81 MG EC tablet Daily.  2   • azelaic acid (AZELEX) 15 % gel      • azelastine (ASTELIN) 0.1 % nasal spray   6   • baclofen (LIORESAL) 10 MG tablet 2 (Two) Times a Day.  2   • betamethasone dipropionate (DIPROLENE) 0.05 % lotion      • busPIRone (BUSPAR) 15 MG tablet 2 (Two) Times a Day.  3   • clotrimazole-betamethasone (LOTRISONE) 1-0.05 % cream Apply  topically to the appropriate area as directed 2 (Two) Times a Day. 45 g 1   • colchicine 0.6 MG tablet      • colestipol (COLESTID) 1 g tablet   2   • Dexilant 60 MG capsule      • dicyclomine (BENTYL) 20 MG tablet As Needed.  2   • DULoxetine (CYMBALTA) 60 MG capsule Take  by mouth.     • Emgality 120 MG/ML auto-injector pen      • famotidine (PEPCID) 40 MG tablet      • fluticasone (FLONASE) 50 MCG/ACT nasal  spray into each nostril.     • HYDROcodone-acetaminophen (NORCO) 7.5-325 MG per tablet   0   • hydrOXYzine pamoate (VISTARIL) 25 MG capsule 2 (Two) Times a Day.  3   • ibuprofen (ADVIL,MOTRIN) 800 MG tablet      • lamoTRIgine (LaMICtal) 150 MG tablet Daily.  3   • lisinopril (PRINIVIL,ZESTRIL) 40 MG tablet Daily.  2   • meloxicam (MOBIC) 7.5 MG tablet 2 (Two) Times a Day.  2   • methocarbamol (ROBAXIN) 750 MG tablet Take  by mouth.     • metoprolol tartrate (LOPRESSOR) 25 MG tablet      • metroNIDAZOLE (METROCREAM) 0.75 % cream   5   • minocycline (DYNACIN) 100 MG tablet Take  by mouth.     • rizatriptan MLT (MAXALT-MLT) 10 MG disintegrating tablet      • sildenafil (Viagra) 100 MG tablet Take 1 tablet by mouth Daily As Needed for Erectile Dysfunction. 20 tablet 11   • simvastatin (ZOCOR) 20 MG tablet      • solifenacin (VESICARE) 10 MG tablet Take 1 tablet by mouth Daily. 30 tablet 11   • SUMAtriptan Succinate (SUMAVEL DOSEPRO) 6 MG/0.5ML solution jet-injector Inject  under the skin.     • tamsulosin (FLOMAX) 0.4 MG capsule 24 hr capsule Take 1 capsule by mouth Daily. 90 capsule 3   • topiramate (TOPAMAX) 25 MG tablet 2 (Two) Times a Day.  3   • verapamil SR (CALAN-SR) 240 MG CR tablet Daily.  2     No current facility-administered medications for this visit.        Allergies:      Allergies   Allergen Reactions   • Cephalexin Rash        Past Surgical History:     Past Surgical History:   Procedure Laterality Date   • CHOLECYSTECTOMY     • SKIN BIOPSY           Social History:     Social History     Socioeconomic History   • Marital status:    Tobacco Use   • Smoking status: Never Smoker   • Smokeless tobacco: Never Used   Substance and Sexual Activity   • Alcohol use: No   • Drug use: No   • Sexual activity: Defer       Family History:     Family History   Problem Relation Age of Onset   • No Known Problems Father    • No Known Problems Mother        Review of Systems:     Review of Systems    Constitutional: Negative.    HENT: Negative.    Eyes: Negative.    Respiratory: Negative.    Cardiovascular: Negative.    Gastrointestinal: Negative.    Endocrine: Negative.    Musculoskeletal: Negative.    Allergic/Immunologic: Negative.    Neurological: Negative.    Hematological: Negative.    Psychiatric/Behavioral: Negative.        Physical Exam:     Physical Exam  Vitals and nursing note reviewed.   Constitutional:       Appearance: He is well-developed.   HENT:      Head: Normocephalic and atraumatic.   Eyes:      Conjunctiva/sclera: Conjunctivae normal.      Pupils: Pupils are equal, round, and reactive to light.   Cardiovascular:      Rate and Rhythm: Normal rate and regular rhythm.      Heart sounds: Normal heart sounds.   Pulmonary:      Effort: Pulmonary effort is normal.      Breath sounds: Normal breath sounds.   Abdominal:      General: Bowel sounds are normal.      Palpations: Abdomen is soft.   Musculoskeletal:         General: Normal range of motion.      Cervical back: Normal range of motion.   Skin:     General: Skin is warm and dry.   Neurological:      Mental Status: He is alert and oriented to person, place, and time.      Deep Tendon Reflexes: Reflexes are normal and symmetric.   Psychiatric:         Behavior: Behavior normal.         Thought Content: Thought content normal.         Judgment: Judgment normal.         I have reviewed the following portions of the patient's history: Allergies, current medications, past family history, past medical history, past social history, past surgical history, problem list, and ROS and confirm it is accurate.      Procedure:       Assessment/Plan:   Low Testosterone: This pleasant male patient presents today with signs and symptoms that are consistent with low testosterone. He has positive Napoleon questionnaire by history, this includes both the sexual and nonsexual side effects.  Sexual side effects include inability to achieve and maintain an erection,  inability to maintain his erection, and decreased interest in sexual activity.  Nonsexual symptomatology includes fatigue, difficulty completing a job, and tiredness.  We had a discussion of the various forms of testosterone available including parenteral, topical, and the form of a patch.  We discussed the efficacy of the gels and the injections, as well as the cost and benefits analysis.  We discussed the studies and talked about heart disease and its effect on prostate cancer, both of which are negligible.  He gave verbal consent to proceed with treatment.  He understands the risks and benefits of length.  He also completed his attempts at fertility. He understands the partial effect on spermatogenesis.  He has signs and symptoms including hot flashes consistent with low testosterone I will repeat the number and probably plan to start him on medication.  Detrusor instability-patient has been diagnosed with detrusor instability which is an irritative bladder symptomatology most likely related to factors such as intake of bladder irritants, postinfectious irritation, prolapse, with a very large differential diagnosis.  The mainstay of treatment has been tight cholinergics which basically cause the bladder to have decreased contractility.  We have discussed the side effects of these treatments including dry mouth, double vision, and increasing constipation.  He is on Vesicare  BPH: Discussed the pathophysiology of BPH and obstruction.  We discussed the static and dynamic effects of BPH as well as using 5 alpha reductase inhibitors versus alpha blockade.  We discussed the indications for transurethral surgery as well PSA testing-I am recommending a PSA blood test that stands for prostate specific antigen.  I discussed the pathophysiology of PSA testing indicating its use in the diagnosis and management of prostate cancer.  I discussed the normal range being 0 to 4, but more appropriately being much closer to 0 to 2  in a normal male.  I discussed the fact that after a certain age we don't recommend PSA testing especially in view of numerous comorbidities, that this will not be a useful test.  I discussed many of the things that can artificially raise PSA including a recent infection, urinary tract infection, and recent sexual intercourse, or even the type of movement such as manipulation of the prostate from riding a bicycle.  After all this is taken into account when the test is reviewed, the most important use of PSA is the velocity measurement.  In other words, the change of PSA with time is a very important factor in the use and that we look for greater than 20% rise over a year to help us make the prediction of prostate cancer.  I also discussed that the use with prostate cancer indicating that after a radical prostatectomy, the PSA should be 0 and any rise indicates an early biochemical recurrence.  And/ or other therapeutic options available including all of the newer techniques.  He is on Flomax                  This document has been electronically signed by KATHY BUTCHER MD November 22, 2021 13:54 EST

## 2021-11-23 LAB
DEPRECATED RDW RBC AUTO: 44.3 FL (ref 37–54)
ERYTHROCYTE [DISTWIDTH] IN BLOOD BY AUTOMATED COUNT: 14.7 % (ref 12.3–15.4)
ESTRADIOL SERPL HS-MCNC: 31.7 PG/ML
HCT VFR BLD AUTO: 44 % (ref 37.5–51)
HGB BLD-MCNC: 14.2 G/DL (ref 13–17.7)
MCH RBC QN AUTO: 26.9 PG (ref 26.6–33)
MCHC RBC AUTO-ENTMCNC: 32.3 G/DL (ref 31.5–35.7)
MCV RBC AUTO: 83.3 FL (ref 79–97)
PLATELET # BLD AUTO: 322 10*3/MM3 (ref 140–450)
PMV BLD AUTO: 10.8 FL (ref 6–12)
PSA SERPL-MCNC: 0.96 NG/ML (ref 0–4)
RBC # BLD AUTO: 5.28 10*6/MM3 (ref 4.14–5.8)
TESTOST SERPL-MCNC: 291 NG/DL (ref 193–740)
WBC NRBC COR # BLD: 7.24 10*3/MM3 (ref 3.4–10.8)

## 2021-11-29 PROBLEM — N40.1 BENIGN PROSTATIC HYPERPLASIA WITH LOWER URINARY TRACT SYMPTOMS: Status: ACTIVE | Noted: 2021-11-29

## 2021-12-01 ENCOUNTER — HOSPITAL ENCOUNTER (OUTPATIENT)
Dept: HOSPITAL 79 - CATH | Age: 56
Discharge: HOME | End: 2021-12-01
Attending: INTERNAL MEDICINE
Payer: MEDICARE

## 2021-12-01 DIAGNOSIS — R94.31: ICD-10-CM

## 2021-12-01 DIAGNOSIS — G47.30: ICD-10-CM

## 2021-12-01 DIAGNOSIS — Z20.822: ICD-10-CM

## 2021-12-01 DIAGNOSIS — Z79.899: ICD-10-CM

## 2021-12-01 DIAGNOSIS — N42.9: ICD-10-CM

## 2021-12-01 DIAGNOSIS — G43.009: ICD-10-CM

## 2021-12-01 DIAGNOSIS — Z88.1: ICD-10-CM

## 2021-12-01 DIAGNOSIS — J30.9: ICD-10-CM

## 2021-12-01 DIAGNOSIS — G44.89: ICD-10-CM

## 2021-12-01 DIAGNOSIS — M10.9: ICD-10-CM

## 2021-12-01 DIAGNOSIS — F31.9: ICD-10-CM

## 2021-12-01 DIAGNOSIS — K25.9: ICD-10-CM

## 2021-12-01 DIAGNOSIS — I08.3: Primary | ICD-10-CM

## 2021-12-01 DIAGNOSIS — K58.9: ICD-10-CM

## 2021-12-01 DIAGNOSIS — I10: ICD-10-CM

## 2021-12-01 DIAGNOSIS — F41.9: ICD-10-CM

## 2021-12-01 LAB
BUN/CREATININE RATIO: 12 (ref 0–10)
HGB BLD-MCNC: 13.3 GM/DL (ref 14–17.5)
RED BLOOD COUNT: 5.03 M/UL (ref 4.2–5.5)
WHITE BLOOD COUNT: 6.4 K/UL (ref 4.5–11)

## 2021-12-21 ENCOUNTER — OFFICE VISIT (OUTPATIENT)
Dept: UROLOGY | Facility: CLINIC | Age: 56
End: 2021-12-21

## 2021-12-21 VITALS — BODY MASS INDEX: 39.9 KG/M2 | HEIGHT: 71 IN | WEIGHT: 285 LBS

## 2021-12-21 DIAGNOSIS — N52.9 IMPOTENCE OF ORGANIC ORIGIN: Primary | ICD-10-CM

## 2021-12-21 DIAGNOSIS — N40.1 BENIGN PROSTATIC HYPERPLASIA WITH URINARY FREQUENCY: ICD-10-CM

## 2021-12-21 DIAGNOSIS — R35.0 BENIGN PROSTATIC HYPERPLASIA WITH URINARY FREQUENCY: ICD-10-CM

## 2021-12-21 PROCEDURE — 99214 OFFICE O/P EST MOD 30 MIN: CPT | Performed by: UROLOGY

## 2021-12-21 RX ORDER — TESTOSTERONE CYPIONATE 200 MG/ML
INJECTION, SOLUTION INTRAMUSCULAR
Qty: 10 ML | Refills: 2 | Status: SHIPPED | OUTPATIENT
Start: 2021-12-21

## 2021-12-29 NOTE — PROGRESS NOTES
Chief Complaint:          Chief Complaint   Patient presents with   • incontience     1 month follow up        HPI:   56 y.o. male returns today follow-up of incontinence.  Still gets up a lot at night he is interested in a more permanent cure I will set him up for cystoscopy to evaluate the degree of obstruction from the prostate as well as any other lesions or strictures noted.  He wishes to continue testosterone therapy.  His labs were reviewed his testosterone was 291, his PSA is normal his estradiol is normal and CBC is normal.  Low Testosterone: This pleasant male patient presents today with signs and symptoms that are consistent with low testosterone. He has positive Napoleon questionnaire by history, this includes both the sexual and nonsexual side effects.  Sexual side effects include inability to achieve and maintain an erection, inability to maintain his erection, and decreased interest in sexual activity.  Nonsexual symptomatology includes fatigue, difficulty completing a job, and tiredness.  We had a discussion of the various forms of testosterone available including parenteral, topical, and the form of a patch.  We discussed the efficacy of the gels and the injections, as well as the cost and benefits analysis.  We discussed the studies and talked about heart disease and its effect on prostate cancer, both of which are negligible.  He gave verbal consent to proceed with treatment.  He understands the risks and benefits of length.  He also completed his attempts at fertility. He understands the partial effect on spermatogenesis.      Past Medical History:        Past Medical History:   Diagnosis Date   • Anxiety    • Arthritis    • Benign prostatic hyperplasia with lower urinary tract symptoms 11/29/2021   • Depression    • Gastric ulcer    • Hypertension    • IBS (irritable bowel syndrome)    • Migraine    • Skin cancer    • Sleep apnea          Current Meds:     Current Outpatient Medications   Medication  Sig Dispense Refill   • amitriptyline (ELAVIL) 100 MG tablet Daily.  3   • AMNESTEEM 40 MG capsule 2 (Two) Times a Day.     • ASPIRIN LOW DOSE 81 MG EC tablet Daily.  2   • azelaic acid (AZELEX) 15 % gel      • azelastine (ASTELIN) 0.1 % nasal spray   6   • baclofen (LIORESAL) 10 MG tablet 2 (Two) Times a Day.  2   • betamethasone dipropionate (DIPROLENE) 0.05 % lotion      • busPIRone (BUSPAR) 15 MG tablet 2 (Two) Times a Day.  3   • clotrimazole-betamethasone (LOTRISONE) 1-0.05 % cream Apply  topically to the appropriate area as directed 2 (Two) Times a Day. 45 g 1   • clotrimazole-betamethasone (Lotrisone) 1-0.05 % cream Apply 1 application topically to the appropriate area as directed 2 (Two) Times a Day. 45 g 2   • colchicine 0.6 MG tablet      • colestipol (COLESTID) 1 g tablet   2   • Dexilant 60 MG capsule      • dicyclomine (BENTYL) 20 MG tablet As Needed.  2   • DULoxetine (CYMBALTA) 60 MG capsule Take  by mouth.     • Emgality 120 MG/ML auto-injector pen      • famotidine (PEPCID) 40 MG tablet      • fluticasone (FLONASE) 50 MCG/ACT nasal spray into each nostril.     • HYDROcodone-acetaminophen (NORCO) 7.5-325 MG per tablet   0   • hydrOXYzine pamoate (VISTARIL) 25 MG capsule 2 (Two) Times a Day.  3   • ibuprofen (ADVIL,MOTRIN) 800 MG tablet      • lamoTRIgine (LaMICtal) 150 MG tablet Daily.  3   • lisinopril (PRINIVIL,ZESTRIL) 40 MG tablet Daily.  2   • meloxicam (MOBIC) 7.5 MG tablet 2 (Two) Times a Day.  2   • methocarbamol (ROBAXIN) 750 MG tablet Take  by mouth.     • metoprolol tartrate (LOPRESSOR) 25 MG tablet      • metroNIDAZOLE (METROCREAM) 0.75 % cream   5   • minocycline (DYNACIN) 100 MG tablet Take  by mouth.     • rizatriptan MLT (MAXALT-MLT) 10 MG disintegrating tablet      • sildenafil (Viagra) 100 MG tablet Take 1 tablet by mouth Daily As Needed for Erectile Dysfunction. 20 tablet 11   • simvastatin (ZOCOR) 20 MG tablet      • solifenacin (VESICARE) 10 MG tablet Take 1 tablet by mouth  "Daily. 30 tablet 11   • SUMAtriptan Succinate (SUMAVEL DOSEPRO) 6 MG/0.5ML solution jet-injector Inject  under the skin.     • tamsulosin (FLOMAX) 0.4 MG capsule 24 hr capsule Take 1 capsule by mouth Daily. 90 capsule 3   • topiramate (TOPAMAX) 25 MG tablet 2 (Two) Times a Day.  3   • verapamil SR (CALAN-SR) 240 MG CR tablet Daily.  2   • Syringe 25G X 5/8\" 3 ML misc Use as directed 2 x weekly 24 each 3   • Testosterone Cypionate (Depo-Testosterone) 200 MG/ML injection Inject 1/2 cc subcutaneously every Monday and Thursday 10 mL 2     No current facility-administered medications for this visit.        Allergies:      Allergies   Allergen Reactions   • Cephalexin Rash        Past Surgical History:     Past Surgical History:   Procedure Laterality Date   • CHOLECYSTECTOMY     • SKIN BIOPSY           Social History:     Social History     Socioeconomic History   • Marital status:    Tobacco Use   • Smoking status: Never Smoker   • Smokeless tobacco: Never Used   Vaping Use   • Vaping Use: Never used   Substance and Sexual Activity   • Alcohol use: No   • Drug use: No   • Sexual activity: Defer       Family History:     Family History   Problem Relation Age of Onset   • No Known Problems Father    • No Known Problems Mother        Review of Systems:     Review of Systems   Constitutional: Negative.    HENT: Negative.    Eyes: Negative.    Respiratory: Negative.    Cardiovascular: Negative.    Gastrointestinal: Negative.    Endocrine: Negative.    Genitourinary: Positive for frequency.   Musculoskeletal: Negative.    Allergic/Immunologic: Negative.    Neurological: Negative.    Hematological: Negative.    Psychiatric/Behavioral: Negative.        Physical Exam:     Physical Exam  Vitals and nursing note reviewed.   Constitutional:       Appearance: He is well-developed.   HENT:      Head: Normocephalic and atraumatic.   Eyes:      Conjunctiva/sclera: Conjunctivae normal.      Pupils: Pupils are equal, round, and " reactive to light.   Cardiovascular:      Rate and Rhythm: Normal rate and regular rhythm.      Heart sounds: Normal heart sounds.   Pulmonary:      Effort: Pulmonary effort is normal.      Breath sounds: Normal breath sounds.   Abdominal:      General: Bowel sounds are normal.      Palpations: Abdomen is soft.   Musculoskeletal:         General: Normal range of motion.      Cervical back: Normal range of motion.   Skin:     General: Skin is warm and dry.   Neurological:      Mental Status: He is alert and oriented to person, place, and time.      Deep Tendon Reflexes: Reflexes are normal and symmetric.   Psychiatric:         Behavior: Behavior normal.         Thought Content: Thought content normal.         Judgment: Judgment normal.         I have reviewed the following portions of the patient's history: Allergies, current medications, past family history, past medical history, past social history, past surgical history, problem list, and ROS and confirm it is accurate.      Procedure:       Assessment/Plan:   Low Testosterone: This pleasant male patient presents today with signs and symptoms that are consistent with low testosterone. He has positive Napoleon questionnaire by history, this includes both the sexual and nonsexual side effects.  Sexual side effects include inability to achieve and maintain an erection, inability to maintain his erection, and decreased interest in sexual activity.  Nonsexual symptomatology includes fatigue, difficulty completing a job, and tiredness.  We had a discussion of the various forms of testosterone available including parenteral, topical, and the form of a patch.  We discussed the efficacy of the gels and the injections, as well as the cost and benefits analysis.  We discussed the studies and talked about heart disease and its effect on prostate cancer, both of which are negligible.  He gave verbal consent to proceed with treatment.  He understands the risks and benefits of  length.  He also completed his attempts at fertility. He understands the partial effect on spermatogenesis.  I will initiate testosterone replacement therapy  BPH: Discussed the pathophysiology of BPH and obstruction.  We discussed the static and dynamic effects of BPH as well as using 5 alpha reductase inhibitors versus alpha blockade.  We discussed the indications for transurethral surgery as well and/ or other therapeutic options available including all of the newer techniques.  Still has considerable frequency I Morena set him up for cystoscopy to evaluate the possibility of a more permanent cure  PSA testing-I am recommending a PSA blood test that stands for prostate specific antigen.  I discussed the pathophysiology of PSA testing indicating its use in the diagnosis and management of prostate cancer.  I discussed the normal range being 0 to 4, but more appropriately being much closer to 0 to 2 in a normal male.  I discussed the fact that after a certain age we don't recommend PSA testing especially in view of numerous comorbidities, that this will not be a useful test.  I discussed many of the things that can artificially raise PSA including a recent infection, urinary tract infection, and recent sexual intercourse, or even the type of movement such as manipulation of the prostate from riding a bicycle.  After all this is taken into account when the test is reviewed, the most important use of PSA is the velocity measurement.  In other words, the change of PSA with time is a very important factor in the use and that we look for greater than 20% rise over a year to help us make the prediction of prostate cancer.  I also discussed that the use with prostate cancer indicating that after a radical prostatectomy, the PSA should be 0 and any rise indicates an early biochemical recurrence.  His PSA is normal              This document has been electronically signed by KATHY BUTCHER MD December 29, 2021 13:50  EST

## 2022-01-14 ENCOUNTER — PRIOR AUTHORIZATION (OUTPATIENT)
Dept: UROLOGY | Facility: CLINIC | Age: 57
End: 2022-01-14

## 2022-01-14 NOTE — TELEPHONE ENCOUNTER
PA Case: [id number], Status: Approved, Coverage Starts on: 1/1/2022 12:00:00 AM, Coverage Ends on: 1/14/2023 12:00:00 AM.      PA sent to insurance. Awaiting response.   supervision

## 2022-01-24 ENCOUNTER — HOSPITAL ENCOUNTER (EMERGENCY)
Dept: HOSPITAL 79 - ER1 | Age: 57
Discharge: HOME | End: 2022-01-24
Payer: MEDICARE

## 2022-01-24 DIAGNOSIS — N17.9: ICD-10-CM

## 2022-01-24 DIAGNOSIS — N18.9: ICD-10-CM

## 2022-01-24 DIAGNOSIS — N13.2: ICD-10-CM

## 2022-01-24 DIAGNOSIS — U07.1: Primary | ICD-10-CM

## 2022-01-24 LAB
BUN/CREATININE RATIO: 14 (ref 0–10)
BUN/CREATININE RATIO: 16 (ref 0–10)
HGB BLD-MCNC: 15.4 GM/DL (ref 14–17.5)
RED BLOOD COUNT: 5.56 M/UL (ref 4.2–5.5)
WHITE BLOOD COUNT: 8.2 K/UL (ref 4.5–11)

## 2022-02-01 ENCOUNTER — HOSPITAL ENCOUNTER (OUTPATIENT)
Dept: HOSPITAL 79 - CT | Age: 57
End: 2022-02-01
Attending: PHYSICIAN ASSISTANT
Payer: MEDICARE

## 2022-02-01 DIAGNOSIS — R94.4: Primary | ICD-10-CM

## 2022-02-01 DIAGNOSIS — R91.8: ICD-10-CM

## 2022-02-01 LAB — BUN/CREATININE RATIO: 14 (ref 0–10)

## 2022-02-16 DIAGNOSIS — N39.41 URGENCY INCONTINENCE: ICD-10-CM

## 2022-02-16 RX ORDER — CLOTRIMAZOLE AND BETAMETHASONE DIPROPIONATE 10; .64 MG/G; MG/G
1 CREAM TOPICAL 2 TIMES DAILY
Qty: 45 G | Refills: 2 | Status: SHIPPED | OUTPATIENT
Start: 2022-02-16

## 2022-02-21 ENCOUNTER — HOSPITAL ENCOUNTER (OUTPATIENT)
Dept: HOSPITAL 79 - HEART 5 | Age: 57
End: 2022-02-21
Attending: INTERNAL MEDICINE
Payer: MEDICARE

## 2022-02-21 DIAGNOSIS — R07.9: Primary | ICD-10-CM

## 2022-02-21 DIAGNOSIS — R06.02: ICD-10-CM

## 2022-02-21 PROCEDURE — A9502 TC99M TETROFOSMIN: HCPCS

## 2022-04-19 ENCOUNTER — HOSPITAL ENCOUNTER (OUTPATIENT)
Dept: HOSPITAL 79 - US | Age: 57
End: 2022-04-19
Attending: PHYSICIAN ASSISTANT
Payer: MEDICARE

## 2022-04-19 DIAGNOSIS — Z86.16: ICD-10-CM

## 2022-04-19 DIAGNOSIS — R60.9: Primary | ICD-10-CM

## 2022-04-19 DIAGNOSIS — M79.605: ICD-10-CM

## 2022-06-30 ENCOUNTER — HOSPITAL ENCOUNTER (OUTPATIENT)
Dept: HOSPITAL 79 - CT | Age: 57
End: 2022-06-30
Payer: MEDICARE

## 2022-06-30 DIAGNOSIS — I10: Primary | ICD-10-CM

## 2022-06-30 DIAGNOSIS — R06.02: ICD-10-CM

## 2022-06-30 LAB — BUN/CREATININE RATIO: 12 (ref 0–10)
